# Patient Record
Sex: FEMALE | Race: OTHER | NOT HISPANIC OR LATINO | Employment: UNEMPLOYED | URBAN - METROPOLITAN AREA
[De-identification: names, ages, dates, MRNs, and addresses within clinical notes are randomized per-mention and may not be internally consistent; named-entity substitution may affect disease eponyms.]

---

## 2020-02-18 PROBLEM — Z86.79: Status: ACTIVE | Noted: 2019-01-30

## 2020-02-18 PROBLEM — Z76.89 ESTABLISHING CARE WITH NEW DOCTOR, ENCOUNTER FOR: Status: ACTIVE | Noted: 2019-01-30

## 2020-02-18 PROBLEM — I63.412 CEREBROVASCULAR ACCIDENT (CVA) DUE TO EMBOLISM OF LEFT MIDDLE CEREBRAL ARTERY (HCC): Status: ACTIVE | Noted: 2019-03-29

## 2020-02-18 PROBLEM — I48.19 PERSISTENT ATRIAL FIBRILLATION (HCC): Status: ACTIVE | Noted: 2019-01-30

## 2022-12-15 ENCOUNTER — APPOINTMENT (EMERGENCY)
Dept: CT IMAGING | Facility: HOSPITAL | Age: 57
End: 2022-12-15

## 2022-12-15 ENCOUNTER — HOSPITAL ENCOUNTER (EMERGENCY)
Facility: HOSPITAL | Age: 57
Discharge: HOME | End: 2022-12-15
Attending: EMERGENCY MEDICINE

## 2022-12-15 VITALS
DIASTOLIC BLOOD PRESSURE: 68 MMHG | OXYGEN SATURATION: 98 % | TEMPERATURE: 98.2 F | SYSTOLIC BLOOD PRESSURE: 116 MMHG | HEART RATE: 104 BPM | RESPIRATION RATE: 18 BRPM | BODY MASS INDEX: 28.88 KG/M2 | WEIGHT: 159.17 LBS

## 2022-12-15 DIAGNOSIS — R11.0 NAUSEA: ICD-10-CM

## 2022-12-15 DIAGNOSIS — K59.00 CONSTIPATION: ICD-10-CM

## 2022-12-15 DIAGNOSIS — R31.9 HEMATURIA: ICD-10-CM

## 2022-12-15 DIAGNOSIS — R10.9 FLANK PAIN: Primary | ICD-10-CM

## 2022-12-15 DIAGNOSIS — K76.9 LIVER LESION: ICD-10-CM

## 2022-12-15 LAB
ALBUMIN SERPL BCP-MCNC: 3.7 G/DL (ref 3.5–5)
ALP SERPL-CCNC: 93 U/L (ref 46–116)
ALT SERPL W P-5'-P-CCNC: 12 U/L (ref 12–78)
ANION GAP SERPL CALCULATED.3IONS-SCNC: 6 MMOL/L (ref 4–13)
APTT PPP: 51 SECONDS (ref 23–37)
AST SERPL W P-5'-P-CCNC: 15 U/L (ref 5–45)
BACTERIA UR QL AUTO: ABNORMAL /HPF
BASOPHILS # BLD AUTO: 0.04 THOUSANDS/ÂΜL (ref 0–0.1)
BASOPHILS NFR BLD AUTO: 0 % (ref 0–1)
BILIRUB SERPL-MCNC: 0.48 MG/DL (ref 0.2–1)
BILIRUB UR QL STRIP: NEGATIVE
BUN SERPL-MCNC: 11 MG/DL (ref 5–25)
CALCIUM SERPL-MCNC: 8.9 MG/DL (ref 8.3–10.1)
CHLORIDE SERPL-SCNC: 102 MMOL/L (ref 96–108)
CLARITY UR: ABNORMAL
CO2 SERPL-SCNC: 30 MMOL/L (ref 21–32)
COLOR UR: ABNORMAL
CREAT SERPL-MCNC: 0.76 MG/DL (ref 0.6–1.3)
EOSINOPHIL # BLD AUTO: 0.03 THOUSAND/ÂΜL (ref 0–0.61)
EOSINOPHIL NFR BLD AUTO: 0 % (ref 0–6)
ERYTHROCYTE [DISTWIDTH] IN BLOOD BY AUTOMATED COUNT: 13.3 % (ref 11.6–15.1)
GFR SERPL CREATININE-BSD FRML MDRD: 87 ML/MIN/1.73SQ M
GLUCOSE SERPL-MCNC: 91 MG/DL (ref 65–140)
GLUCOSE UR STRIP-MCNC: NEGATIVE MG/DL
HCT VFR BLD AUTO: 36.9 % (ref 34.8–46.1)
HGB BLD-MCNC: 11.7 G/DL (ref 11.5–15.4)
HGB UR QL STRIP.AUTO: ABNORMAL
IMM GRANULOCYTES # BLD AUTO: 0.05 THOUSAND/UL (ref 0–0.2)
IMM GRANULOCYTES NFR BLD AUTO: 0 % (ref 0–2)
INR PPP: 2.69 (ref 0.84–1.19)
KETONES UR STRIP-MCNC: NEGATIVE MG/DL
LEUKOCYTE ESTERASE UR QL STRIP: ABNORMAL
LYMPHOCYTES # BLD AUTO: 1.63 THOUSANDS/ÂΜL (ref 0.6–4.47)
LYMPHOCYTES NFR BLD AUTO: 14 % (ref 14–44)
MCH RBC QN AUTO: 28.4 PG (ref 26.8–34.3)
MCHC RBC AUTO-ENTMCNC: 31.7 G/DL (ref 31.4–37.4)
MCV RBC AUTO: 90 FL (ref 82–98)
MONOCYTES # BLD AUTO: 0.72 THOUSAND/ÂΜL (ref 0.17–1.22)
MONOCYTES NFR BLD AUTO: 6 % (ref 4–12)
MUCOUS THREADS UR QL AUTO: ABNORMAL
NEUTROPHILS # BLD AUTO: 9.22 THOUSANDS/ÂΜL (ref 1.85–7.62)
NEUTS SEG NFR BLD AUTO: 80 % (ref 43–75)
NITRITE UR QL STRIP: NEGATIVE
NON-SQ EPI CELLS URNS QL MICRO: ABNORMAL /HPF
NRBC BLD AUTO-RTO: 0 /100 WBCS
PH UR STRIP.AUTO: 6 [PH] (ref 4.5–8)
PLATELET # BLD AUTO: 291 THOUSANDS/UL (ref 149–390)
PMV BLD AUTO: 9.8 FL (ref 8.9–12.7)
POTASSIUM SERPL-SCNC: 4.1 MMOL/L (ref 3.5–5.3)
PROT SERPL-MCNC: 8.6 G/DL (ref 6.4–8.4)
PROT UR STRIP-MCNC: ABNORMAL MG/DL
PROTHROMBIN TIME: 28.5 SECONDS (ref 11.6–14.5)
RBC # BLD AUTO: 4.12 MILLION/UL (ref 3.81–5.12)
RBC #/AREA URNS AUTO: ABNORMAL /HPF
SODIUM SERPL-SCNC: 138 MMOL/L (ref 135–147)
SP GR UR STRIP.AUTO: >=1.03 (ref 1–1.03)
UROBILINOGEN UR QL STRIP.AUTO: 0.2 E.U./DL
WBC # BLD AUTO: 11.69 THOUSAND/UL (ref 4.31–10.16)
WBC #/AREA URNS AUTO: ABNORMAL /HPF

## 2022-12-15 RX ORDER — POLYETHYLENE GLYCOL 3350 17 G/17G
17 POWDER, FOR SOLUTION ORAL DAILY
Qty: 10 EACH | Refills: 0 | Status: SHIPPED | OUTPATIENT
Start: 2022-12-15 | End: 2022-12-22

## 2022-12-15 RX ORDER — ONDANSETRON 2 MG/ML
4 INJECTION INTRAMUSCULAR; INTRAVENOUS ONCE
Status: COMPLETED | OUTPATIENT
Start: 2022-12-15 | End: 2022-12-15

## 2022-12-15 RX ORDER — MORPHINE SULFATE 4 MG/ML
4 INJECTION, SOLUTION INTRAMUSCULAR; INTRAVENOUS ONCE
Status: COMPLETED | OUTPATIENT
Start: 2022-12-15 | End: 2022-12-15

## 2022-12-15 RX ORDER — SENNOSIDES 8.6 MG
650 CAPSULE ORAL EVERY 8 HOURS PRN
Qty: 30 TABLET | Refills: 0 | Status: SHIPPED | OUTPATIENT
Start: 2022-12-15

## 2022-12-15 RX ORDER — ONDANSETRON 4 MG/1
4 TABLET, ORALLY DISINTEGRATING ORAL EVERY 6 HOURS PRN
Qty: 20 TABLET | Refills: 0 | Status: SHIPPED | OUTPATIENT
Start: 2022-12-15

## 2022-12-15 RX ADMIN — ONDANSETRON 4 MG: 2 INJECTION INTRAMUSCULAR; INTRAVENOUS at 12:09

## 2022-12-15 RX ADMIN — MORPHINE SULFATE 4 MG: 4 INJECTION INTRAVENOUS at 12:09

## 2022-12-15 RX ADMIN — SODIUM CHLORIDE 500 ML: 0.9 INJECTION, SOLUTION INTRAVENOUS at 12:07

## 2022-12-15 NOTE — ED PROVIDER NOTES
History  Chief Complaint   Patient presents with   • Flank Pain     Pt c/o left flank pain for 3 days denies fevers and urinary symptoms      Torin Rodriguez is a 63 yo F, history of a fib on warfarin, presenting with family for evaluation of left flank/low back pain for the past 3 days  She reports the pain has waxed/waned since onset but does seem to worsen with movement  She reports some nausea as well without vomiting  She also reports she does have chronic constipation although she continues to pass flatus as normal  Reports occasional radiation of pain to LLQ today  No urinary symptoms of urgency/frequency/hematuria or dysuria  No fevers/chills  No preceding injury/trauma  She does report remote history of kidney stones  History provided by:  Patient, spouse and relative   used: Yes        Prior to Admission Medications   Prescriptions Last Dose Informant Patient Reported? Taking?    Cholecalciferol (Vitamin D3) 50 MCG (2000 UT) capsule   No No   Sig: Take 1 capsule (2,000 Units total) by mouth daily   alendronate (FOSAMAX) 70 mg tablet   No No   Sig: TAKE 1 TABLET (70 MG TOTAL) BY MOUTH EVERY 7 DAYS   amiodarone 200 mg tablet   No No   Sig: TAKE 1/2 TABLET BY MOUTH EVERY DAY   atenolol (TENORMIN) 25 mg tablet   No No   Sig: TAKE 1 TABLET BY MOUTH EVERY DAY   bisoprolol (ZEBETA) 5 mg tablet   No No   Sig: Take 0 5 tablets (2 5 mg total) by mouth daily   diclofenac sodium (VOLTAREN) 1 %   No No   Sig: Apply 2 g topically 4 (four) times a day   furosemide (LASIX) 40 mg tablet   No No   Sig: TAKE 1 TABLET BY MOUTH EVERY DAY   hydrocortisone (ANUSOL-HC) 2 5 % rectal cream   No No   Sig: Apply topically 2 (two) times a day   naproxen (NAPROSYN) 500 mg tablet   Yes No   Sig: take 1 tablet by mouth every 12 hours if needed for MILD PAIN ON A SCALE 1-3--TAKE WITH MEALS   warfarin (COUMADIN) 2 5 mg tablet   No No   Sig: TAKE 1 TABLET BY MOUTH EVERY DAY      Facility-Administered Medications: None Past Medical History:   Diagnosis Date   • Atrial fibrillation [facility])    • Stroke [facility])        History reviewed  No pertinent surgical history  History reviewed  No pertinent family history  I have reviewed and agree with the history as documented  E-Cigarette/Vaping     E-Cigarette/Vaping Substances     Social History     Tobacco Use   • Smoking status: Never   • Smokeless tobacco: Never       Review of Systems   Constitutional: Negative for chills and fever  HENT: Negative for congestion, rhinorrhea and sore throat  Eyes: Negative for pain and visual disturbance  Respiratory: Negative for cough, shortness of breath and wheezing  Cardiovascular: Negative for chest pain and palpitations  Gastrointestinal: Positive for nausea  Negative for abdominal pain, diarrhea and vomiting  Genitourinary: Positive for flank pain  Negative for dysuria, frequency and urgency  Musculoskeletal: Positive for back pain  Negative for neck pain and neck stiffness  Skin: Negative for rash and wound  Neurological: Negative for dizziness, weakness, light-headedness and numbness  Physical Exam  Physical Exam  Constitutional:       General: She is not in acute distress  Appearance: She is well-developed  She is not diaphoretic  HENT:      Head: Normocephalic and atraumatic  Right Ear: External ear normal       Left Ear: External ear normal    Eyes:      Conjunctiva/sclera: Conjunctivae normal       Pupils: Pupils are equal, round, and reactive to light  Cardiovascular:      Rate and Rhythm: Normal rate and regular rhythm  Heart sounds: Normal heart sounds  No murmur heard  No friction rub  No gallop  Pulmonary:      Effort: Pulmonary effort is normal  No respiratory distress  Breath sounds: Normal breath sounds  No wheezing  Abdominal:      General: There is no distension  Palpations: Abdomen is soft  Tenderness: There is no abdominal tenderness   There is no right CVA tenderness or left CVA tenderness  Musculoskeletal:      Cervical back: Normal range of motion and neck supple  Comments: No specific CVAT or paraspinal tenderness, although pain is very clearly exacerbated by movement including bending, twisting   Lymphadenopathy:      Cervical: No cervical adenopathy  Skin:     General: Skin is warm and dry  Capillary Refill: Capillary refill takes less than 2 seconds  Findings: No erythema or rash  Neurological:      Mental Status: She is alert and oriented to person, place, and time  Motor: No abnormal muscle tone  Coordination: Coordination normal    Psychiatric:         Behavior: Behavior normal          Thought Content:  Thought content normal          Judgment: Judgment normal          Vital Signs  ED Triage Vitals   Temperature Pulse Respirations Blood Pressure SpO2   12/15/22 1113 12/15/22 1113 12/15/22 1113 12/15/22 1113 12/15/22 1113   98 2 °F (36 8 °C) (!) 108 20 114/65 98 %      Temp Source Heart Rate Source Patient Position - Orthostatic VS BP Location FiO2 (%)   12/15/22 1113 12/15/22 1506 12/15/22 1113 12/15/22 1113 --   Oral Monitor Sitting Left arm       Pain Score       12/15/22 1209       8           Vitals:    12/15/22 1113 12/15/22 1506   BP: 114/65 116/68   Pulse: (!) 108 104   Patient Position - Orthostatic VS: Sitting          Visual Acuity  Visual Acuity    Flowsheet Row Most Recent Value   L Pupil Size (mm) 3   R Pupil Size (mm) 3          ED Medications  Medications   sodium chloride 0 9 % bolus 500 mL (0 mL Intravenous Stopped 12/15/22 1307)   ondansetron (ZOFRAN) injection 4 mg (4 mg Intravenous Given 12/15/22 1209)   morphine injection 4 mg (4 mg Intravenous Given 12/15/22 1209)       Diagnostic Studies  Results Reviewed     Procedure Component Value Units Date/Time    Urine culture [[id number]]     Lab Status: No result Specimen: Urine     Urine Microscopic [[id number]]  (Abnormal) Collected: 12/15/22 1307    Lab Status: Final result Specimen: Urine, Clean Catch Updated: 12/15/22 1549     RBC, UA 10-20 /hpf      WBC, UA 4-10 /hpf      Epithelial Cells Moderate /hpf      Bacteria, UA Innumerable /hpf      MUCUS THREADS Moderate    APTT [[id number]]  (Abnormal) Collected: 12/15/22 1207    Lab Status: Final result Specimen: Blood from Arm, Left Updated: 12/15/22 1315     PTT 51 seconds     Protime-INR [[id number]]  (Abnormal) Collected: 12/15/22 1207    Lab Status: Final result Specimen: Blood from Arm, Left Updated: 12/15/22 1315     Protime 28 5 seconds      INR 2 69    Urine Macroscopic, POC [[id number]]  (Abnormal) Collected: 12/15/22 1307    Lab Status: Final result Specimen: Urine Updated: 12/15/22 1308     Color, UA Nelda     Clarity, UA Slightly Cloudy     pH, UA 6 0     Leukocytes, UA Trace     Nitrite, UA Negative     Protein, UA 30 (1+) mg/dl      Glucose, UA Negative mg/dl      Ketones, UA Negative mg/dl      Urobilinogen, UA 0 2 E U /dl      Bilirubin, UA Negative     Occult Blood, UA Moderate     Specific Gravity, UA >=1 030    Narrative:      CLINITEK RESULT    Comprehensive metabolic panel [[id number]]  (Abnormal) Collected: 12/15/22 1207    Lab Status: Final result Specimen: Blood from Arm, Left Updated: 12/15/22 1254     Sodium 138 mmol/L      Potassium 4 1 mmol/L      Chloride 102 mmol/L      CO2 30 mmol/L      ANION GAP 6 mmol/L      BUN 11 mg/dL      Creatinine 0 76 mg/dL      Glucose 91 mg/dL      Calcium 8 9 mg/dL      AST 15 U/L      ALT 12 U/L      Alkaline Phosphatase 93 U/L      Total Protein 8 6 g/dL      Albumin 3 7 g/dL      Total Bilirubin 0 48 mg/dL      eGFR 87 ml/min/1 73sq m     Narrative:      [place] guidelines for Chronic Kidney Disease (CKD):   •  Stage 1 with normal or high GFR (GFR > 90 mL/min/1 73 square meters)  •  Stage 2 Mild CKD (GFR = 60-89 mL/min/1 73 square meters)  •  Stage 3A Moderate CKD (GFR = 45-59 mL/min/1 73 square meters)  •  Stage 3B Moderate CKD (GFR = 30-44 mL/min/1 73 square meters)  •  Stage 4 Severe CKD (GFR = 15-29 mL/min/1 73 square meters)  •  Stage 5 End Stage CKD (GFR <15 mL/min/1 73 square meters)  Note: GFR calculation is accurate only with a steady state creatinine    CBC and differential [[id number]]  (Abnormal) Collected: 12/15/22 1207    Lab Status: Final result Specimen: Blood from Arm, Left Updated: 12/15/22 1226     WBC 11 69 Thousand/uL      RBC 4 12 Million/uL      Hemoglobin 11 7 g/dL      Hematocrit 36 9 %      MCV 90 fL      MCH 28 4 pg      MCHC 31 7 g/dL      RDW 13 3 %      MPV 9 8 fL      Platelets 855 Thousands/uL      nRBC 0 /100 WBCs      Neutrophils Relative 80 %      Immat GRANS % 0 %      Lymphocytes Relative 14 %      Monocytes Relative 6 %      Eosinophils Relative 0 %      Basophils Relative 0 %      Neutrophils Absolute 9 22 Thousands/µL      Immature Grans Absolute 0 05 Thousand/uL      Lymphocytes Absolute 1 63 Thousands/µL      Monocytes Absolute 0 72 Thousand/µL      Eosinophils Absolute 0 03 Thousand/µL      Basophils Absolute 0 04 Thousands/µL                  CT renal stone study abdomen pelvis wo contrast   Final Result by Larry Caruso MD (12/15 1351)      Negative for nephrolithiasis  Possible 3 cm lesion in the left lobe of the liver  The sensitivity of this exam is decreased due to lack of IV contrast and low-dose technique  Dedicated CT scan with contrast on an elective basis is recommended to exclude a true lesion  Large amount of stool throughout the colon  The study was marked in [facility] for immediate notification  Workstation performed: [id number]                    Procedures  Procedures         ED Course  ED Course as of 12/15/22 1728   Thu Dec 15, 2022   1430 Pain completely resolved at this time  No further nausea/vomiting                                                MDM  Number of Diagnoses or Management Options  Constipation  Flank pain  Hematuria  Liver lesion  Nausea  Diagnosis management comments: Three days of L flank pain, intermittent nausea, and ongoing constipation  Does note history of kidney stones  She does not have specific CVA/paraspinal TTP but does note pain significantly worsens with movement possibly representing msk etiology  Notes some radiation to LLQ but no TTP  Urine dip noted to have moderate blood, trace leuks - no UTI symptoms at this time  CT reveals constipation and incidentally noted liver lesion  No current obstructing ureteral stones, although cannot exclude recently passed stone given history of same, hematuria, and flank pain  She reports significant improvement in pain with ED therapy and appears stable for discharge  Plan to discharge with tylenol, voltaren as needed for low back pain, Zofran PRN N/V  Will trial miralax for constipation  Follow up with PCP recommended for re-evaluation and consideration of further imaging for liver lesion  Return to ED indications discussed  Amount and/or Complexity of Data Reviewed  Clinical lab tests: ordered and reviewed  Tests in the radiology section of CPT®: ordered and reviewed    Patient Progress  Patient progress: stable      Disposition  Final diagnoses:   Flank pain   Nausea   Hematuria   Liver lesion   Constipation     Time reflects when diagnosis was documented in both MDM as applicable and the Disposition within this note     Time User Action Codes Description Comment    12/15/2022  2:32 PM Shea Humnoke Add [R10 9] Flank pain     12/15/2022  2:32 PM Shea Humnoke Add [R11 0] Nausea     12/15/2022  2:33 PM Shea Humnoke Add [R31 9] Hematuria     12/15/2022  2:33 PM Shea Humnoke Add [K76 9] Liver lesion     12/15/2022  2:42 PM Shea Humnoke Add [K59 00] Constipation       ED Disposition     ED Disposition   Discharge    Condition   Stable    Date/Time   Thu Dec 15, 2022  2:32 PM    Comment   Mallory Vivar discharge to home/self care  Follow-up Information     Follow up With Specialties Details Why Contact Info Additional Information    Lilliana Menchaca MD Family Medicine Schedule an appointment as soon as possible for a visit   [facility] [place] [address] [phone]       [facility] Emergency Department Emergency Medicine  If symptoms worsen [facility] [phone]  [address] [facility], [address]  , [place], [place], [address]          Discharge Medication List as of 12/15/2022  2:44 PM      START taking these medications    Details   acetaminophen (TYLENOL) 650 mg CR tablet Take 1 tablet (650 mg total) by mouth every 8 (eight) hours as needed for mild pain or moderate pain, Starting Thu 12/15/2022, Normal      ondansetron (ZOFRAN-ODT) 4 mg disintegrating tablet Take 1 tablet (4 mg total) by mouth every 6 (six) hours as needed for nausea or vomiting, Starting Thu 12/15/2022, Normal      polyethylene glycol (MIRALAX) 17 g packet Take 17 g by mouth daily for 7 days, Starting Thu 12/15/2022, Until Thu 12/22/2022, Normal         CONTINUE these medications which have NOT CHANGED    Details   alendronate (FOSAMAX) 70 mg tablet TAKE 1 TABLET (70 MG TOTAL) BY MOUTH EVERY 7 DAYS, Starting Wed 6/8/2022, Normal      amiodarone 200 mg tablet TAKE 1/2 TABLET BY MOUTH EVERY DAY, Normal      atenolol (TENORMIN) 25 mg tablet TAKE 1 TABLET BY MOUTH EVERY DAY, Normal      bisoprolol (ZEBETA) 5 mg tablet Take 0 5 tablets (2 5 mg total) by mouth daily, Starting Mon 1/11/2021, Normal      Cholecalciferol (Vitamin D3) 50 MCG (2000 UT) capsule Take 1 capsule (2,000 Units total) by mouth daily, Starting Mon 3/1/2021, Normal      diclofenac sodium (VOLTAREN) 1 % Apply 2 g topically 4 (four) times a day, Starting Sat 11/2/2019, Normal      furosemide (LASIX) 40 mg tablet TAKE 1 TABLET BY MOUTH EVERY DAY, Normal      hydrocortisone (ANUSOL-HC) 2 5 % rectal cream Apply topically 2 (two) times a day, Starting Mon 1/11/2021, Normal      naproxen (NAPROSYN) 500 mg tablet take 1 tablet by mouth every 12 hours if needed for MILD PAIN ON A SCALE 1-3--TAKE WITH MEALS, Historical Med      warfarin (COUMADIN) 2 5 mg tablet TAKE 1 TABLET BY MOUTH EVERY DAY, Normal             No discharge procedures on file      PDMP Review     None          ED Provider  Electronically Signed by           Dayana Santamaria PA-C  12/15/22 2804

## 2022-12-15 NOTE — DISCHARGE INSTRUCTIONS
Please refer to the attached information for strict return instructions  If symptoms worsen or new symptoms develop please return to the ER  Please follow up with your primary care physician for re-evaluation of symptoms, including for re-evaluation of lesion seen on your liver and possible ultrasound

## 2022-12-16 LAB — BACTERIA UR CULT: NORMAL
